# Patient Record
Sex: FEMALE | Race: BLACK OR AFRICAN AMERICAN
[De-identification: names, ages, dates, MRNs, and addresses within clinical notes are randomized per-mention and may not be internally consistent; named-entity substitution may affect disease eponyms.]

---

## 2018-01-18 ENCOUNTER — HOSPITAL ENCOUNTER (EMERGENCY)
Dept: HOSPITAL 17 - NEPD | Age: 53
Discharge: HOME | End: 2018-01-18
Payer: COMMERCIAL

## 2018-01-18 VITALS
HEART RATE: 106 BPM | TEMPERATURE: 97.9 F | SYSTOLIC BLOOD PRESSURE: 111 MMHG | RESPIRATION RATE: 20 BRPM | DIASTOLIC BLOOD PRESSURE: 71 MMHG | OXYGEN SATURATION: 100 %

## 2018-01-18 VITALS — RESPIRATION RATE: 16 BRPM

## 2018-01-18 VITALS — BODY MASS INDEX: 36.92 KG/M2 | HEIGHT: 62 IN | WEIGHT: 200.62 LBS

## 2018-01-18 DIAGNOSIS — R10.11: Primary | ICD-10-CM

## 2018-01-18 DIAGNOSIS — K76.0: ICD-10-CM

## 2018-01-18 DIAGNOSIS — E78.00: ICD-10-CM

## 2018-01-18 DIAGNOSIS — F17.200: ICD-10-CM

## 2018-01-18 DIAGNOSIS — F32.9: ICD-10-CM

## 2018-01-18 LAB
ALBUMIN SERPL-MCNC: 3.7 GM/DL (ref 3.4–5)
ALP SERPL-CCNC: 113 U/L (ref 45–117)
ALT SERPL-CCNC: 13 U/L (ref 10–53)
AST SERPL-CCNC: 18 U/L (ref 15–37)
BASOPHILS # BLD AUTO: 0.1 TH/MM3 (ref 0–0.2)
BASOPHILS NFR BLD: 0.4 % (ref 0–2)
BILIRUB SERPL-MCNC: 1 MG/DL (ref 0.2–1)
BUN SERPL-MCNC: 11 MG/DL (ref 7–18)
CALCIUM SERPL-MCNC: 9 MG/DL (ref 8.5–10.1)
CHLORIDE SERPL-SCNC: 106 MEQ/L (ref 98–107)
CREAT SERPL-MCNC: 0.91 MG/DL (ref 0.5–1)
EOSINOPHIL # BLD: 0 TH/MM3 (ref 0–0.4)
EOSINOPHIL NFR BLD: 0.1 % (ref 0–4)
ERYTHROCYTE [DISTWIDTH] IN BLOOD BY AUTOMATED COUNT: 13.6 % (ref 11.6–17.2)
GFR SERPLBLD BASED ON 1.73 SQ M-ARVRAT: 79 ML/MIN (ref 89–?)
GLUCOSE SERPL-MCNC: 114 MG/DL (ref 74–106)
HCO3 BLD-SCNC: 27.6 MEQ/L (ref 21–32)
HCT VFR BLD CALC: 40.7 % (ref 35–46)
HGB BLD-MCNC: 13.7 GM/DL (ref 11.6–15.3)
LIPASE: 160 U/L (ref 73–393)
LYMPHOCYTES # BLD AUTO: 3 TH/MM3 (ref 1–4.8)
LYMPHOCYTES NFR BLD AUTO: 22.3 % (ref 9–44)
MCH RBC QN AUTO: 27.4 PG (ref 27–34)
MCHC RBC AUTO-ENTMCNC: 33.6 % (ref 32–36)
MCV RBC AUTO: 81.5 FL (ref 80–100)
MONOCYTE #: 0.7 TH/MM3 (ref 0–0.9)
MONOCYTES NFR BLD: 4.8 % (ref 0–8)
NEUTROPHILS # BLD AUTO: 9.8 TH/MM3 (ref 1.8–7.7)
NEUTROPHILS NFR BLD AUTO: 72.4 % (ref 16–70)
PLATELET # BLD: 453 TH/MM3 (ref 150–450)
PMV BLD AUTO: 8.5 FL (ref 7–11)
PROT SERPL-MCNC: 8.5 GM/DL (ref 6.4–8.2)
RBC # BLD AUTO: 4.99 MIL/MM3 (ref 4–5.3)
SODIUM SERPL-SCNC: 139 MEQ/L (ref 136–145)
WBC # BLD AUTO: 13.6 TH/MM3 (ref 4–11)

## 2018-01-18 PROCEDURE — 96374 THER/PROPH/DIAG INJ IV PUSH: CPT

## 2018-01-18 PROCEDURE — 96375 TX/PRO/DX INJ NEW DRUG ADDON: CPT

## 2018-01-18 PROCEDURE — 85025 COMPLETE CBC W/AUTO DIFF WBC: CPT

## 2018-01-18 PROCEDURE — 76705 ECHO EXAM OF ABDOMEN: CPT

## 2018-01-18 PROCEDURE — 83690 ASSAY OF LIPASE: CPT

## 2018-01-18 PROCEDURE — 99284 EMERGENCY DEPT VISIT MOD MDM: CPT

## 2018-01-18 PROCEDURE — 80053 COMPREHEN METABOLIC PANEL: CPT

## 2018-01-18 NOTE — RADRPT
EXAM DATE/TIME:  2018 11:39 

 

[facility] COMPARISON:     

US ABDOMEN - GALLBLADDER, May 29, 2013, 10:30.

        

 

 

INDICATIONS :     

Abdominal pain. Gallstones. 

                     

 

MEDICAL HISTORY :     

Hypercholesterolemia. Gastroesophageal reflux disease. Osteoarthritis. Gastritis. Gallstones. Depress
ion.

 

SURGICAL HISTORY :     

Hysterectomy.  section.   

 

ENCOUNTER:     

Initial

 

ACUITY:     

1 day

 

PAIN SCORE:     

10/10

 

LOCATION:     

Right upper quadrant 

                     

MEASUREMENTS:     

 

LIVER:     

13.7 cm length 

 

COMMON DUCT:     

5 mm

 

RIGHT KIDNEY:     

11.3 x 6.0 x 5.1 cm

 

FINDINGS: 

Suboptimal study due to overlying bowel gas and the patient's body habitus limiting visualization.   
  

 

LIVER:     

Normal in size and shape with mildly increased echogenicity. There is no focal mass or ductal dilatat
ion.

 

COMMON DUCT:     

No intraluminal mass or stone visualized.  

 

GALLBLADDER:          

Normal in size and wall thickness. There is a small echogenic apparent gallstone as well as sludge. T
here is no pericholecystic fluid. There is a negative sonographic Bowers's.

 

PANCREAS:          

The visualized portions are within normal limits.  

 

RIGHT KIDNEY:          

No evidence of hydronephrosis, stone, or mass.  

 

CONCLUSION:     

1. Apparent small gallstone and sludge with no wall thickening or biliary obstruction.

2. Mild hepatic steatosis.

3. Suboptimal visualization.

 

 

 

 Niko Bell MD on 2018 at 12:23           

Board Certified Radiologist.

 This report was verified electronically.

## 2018-01-18 NOTE — PD
HPI


Chief Complaint:  Abdominal Pain


Time Seen by Provider:  10:00


Travel History


International Travel<30 days:  No


Contact w/Intl Traveler<30days:  No


Traveled to known affect area:  No





History of Present Illness


HPI


Patient is a 52-year-old female presents emergency department for evaluation of 

right upper quadrant abdominal pain nausea without vomiting and no diarrhea.  

Patient states been going on for the past several months worsening last night.  

She states that she recently got insurance and no she has gallstones and wants 

to have them taken out.  She has not seen a surgeon for this problem.  Denies 

any fevers.  She states the pain is moderate, right upper quadrant, no radiation

, context associated signs symptoms as above.





PFSH


Past Medical History


Depression:  Yes


Cardiovascular Problems:  Yes (LOW BLOOD PRESSURE)


High Cholesterol:  Yes


Diminished Hearing:  No


Gastrointestinal Disorders:  Yes (GASTRITIS, GALLSTONES)


GERD:  Yes


Immunizations Current:  Yes


Pregnant?:  Not Pregnant





Past Surgical History


 Section:  Yes (x3)


Gynecologic Surgery:  Yes (HYSTERECTOMY)


Hysterectomy:  Yes





Social History


Alcohol Use:  Yes (SOCIAL)


Tobacco Use:  Yes (1/2 ppd)


Substance Use:  No





Allergies-Medications


(Allergen,Severity, Reaction):  


Coded Allergies:  


     metronidazole (Unverified  Allergy, Severe, 18)


Reported Meds & Prescriptions





Reported Meds & Active Scripts


Active


Zofran Odt (Ondansetron Odt) 4 Mg Tab 4 Mg SL ONCE


Reported


Latuda (Lurasidone) 40 Mg Tab 40 Mg PO DAILY


Clonidine (Clonidine HCl) 0.1 Mg Tab 0.1 Mg PO BID


Atorvastatin (Atorvastatin Calcium) 80 Mg Tab 80 Mg PO HS








Review of Systems


Except as stated in HPI:  all other systems reviewed are Neg





Physical Exam


Narrative


GENERAL: Well-developed well-nourished in no obvious distress


SKIN: Focused skin assessment warm/dry.


HEAD: Atraumatic. Normocephalic. 


EYES: Pupils equal and round. No scleral icterus. No injection or drainage. 


ENT: No nasal bleeding or discharge.  Mucous membranes pink and moist.


NECK: Trachea midline. No JVD. 


CARDIOVASCULAR: Regular rate and rhythm.  No murmur appreciated.


RESPIRATORY: No accessory muscle use. Clear to auscultation. Breath sounds 

equal bilaterally. 


GASTROINTESTINAL: Abdomen soft, minimally tender in the right upper quadrant 

and Bowers sign is negative.  No rebound no percussive tenderness.  Psoas and 

obturator signs.  No peritoneal signs, no percussive tenderness. nondistended. 

Hepatic and splenic margins not palpable. 


MUSCULOSKELETAL: No obvious deformities. No clubbing.  No cyanosis.  No edema. 


NEUROLOGICAL: Awake and alert. No obvious cranial nerve deficits.  Motor 

grossly within normal limits. Normal speech.


PSYCHIATRIC: Appropriate mood and affect; insight and judgment normal.





Data


Data


Last Documented VS





Vital Signs








  Date Time  Temp Pulse Resp B/P (MAP) Pulse Ox O2 Delivery O2 Flow Rate FiO2


 


18 12:45        


 


18 11:50   16     


 


18 09:36 97.9 106   100 Room Air  








Orders





 Orders


Complete Blood Count With Diff (18 10:17)


Comprehensive Metabolic Panel (18 10:17)


Lipase (18 10:17)


Iv Access Insert/Monitor (18 10:17)


Ecg Monitoring (18 10:17)


Oximetry (18 10:17)


Ondansetron Inj (Zofran Inj) (18 10:30)


Sodium Chloride 0.9% Flush (Ns Flush) (18 10:30)


Ketorolac Inj (Toradol Inj) (18 10:30)


Us Abdomen Gallbladder (18 )


Ed Discharge Order (18 12:32)





Labs





Laboratory Tests








Test


  18


10:30


 


White Blood Count 13.6 TH/MM3 


 


Red Blood Count 4.99 MIL/MM3 


 


Hemoglobin 13.7 GM/DL 


 


Hematocrit 40.7 % 


 


Mean Corpuscular Volume 81.5 FL 


 


Mean Corpuscular Hemoglobin 27.4 PG 


 


Mean Corpuscular Hemoglobin


Concent 33.6 % 


 


 


Red Cell Distribution Width 13.6 % 


 


Platelet Count 453 TH/MM3 


 


Mean Platelet Volume 8.5 FL 


 


Neutrophils (%) (Auto) 72.4 % 


 


Lymphocytes (%) (Auto) 22.3 % 


 


Monocytes (%) (Auto) 4.8 % 


 


Eosinophils (%) (Auto) 0.1 % 


 


Basophils (%) (Auto) 0.4 % 


 


Neutrophils # (Auto) 9.8 TH/MM3 


 


Lymphocytes # (Auto) 3.0 TH/MM3 


 


Monocytes # (Auto) 0.7 TH/MM3 


 


Eosinophils # (Auto) 0.0 TH/MM3 


 


Basophils # (Auto) 0.1 TH/MM3 


 


CBC Comment DIFF FINAL 


 


Differential Comment  


 


Blood Urea Nitrogen 11 MG/DL 


 


Creatinine 0.91 MG/DL 


 


Random Glucose 114 MG/DL 


 


Total Protein 8.5 GM/DL 


 


Albumin 3.7 GM/DL 


 


Calcium Level 9.0 MG/DL 


 


Alkaline Phosphatase 113 U/L 


 


Aspartate Amino Transf


(AST/SGOT) 18 U/L 


 


 


Alanine Aminotransferase


(ALT/SGPT) 13 U/L 


 


 


Total Bilirubin 1.0 MG/DL 


 


Sodium Level 139 MEQ/L 


 


Potassium Level 4.1 MEQ/L 


 


Chloride Level 106 MEQ/L 


 


Carbon Dioxide Level 27.6 MEQ/L 


 


Anion Gap 5 MEQ/L 


 


Estimat Glomerular Filtration


Rate 79 ML/MIN 


 


 


Lipase 160 U/L 











[facility]


Medical Decision Making


Medical Screen Exam Complete:  Yes


Emergency Medical Condition:  Yes


Differential Diagnosis


Cholecystitis unlikely, biliary colic, gastritis, pancreatitis.


Narrative Course


Patient roomed in emergency department, basic labs are reassuring, lipase 

negative.  Patient underwent ultrasound of the right upper abdomen shows:


Last 24 hours Impressions








Gall Bladder Ultrasound 18 0000 Signed





Impressions: 





 Service Date/Time:   11:39 - CONCLUSION:  1. 

Apparent 





 small gallstone and sludge with no wall thickening or biliary obstruction. 2. 





 Mild hepatic steatosis. 3. Suboptimal visualization.     Niko Bell MD 





Patient feeling better after pain medicines and antiemetics in the emergency 

department, on further review patient did have fried chicken wings last night, 

discussed avoiding fatty foods, return to ED criteria symptomatic management 

follow-up with a general surgeon.  She is stable for discharge.





Diagnosis





 Primary Impression:  


 Right upper quadrant abdominal pain


Referrals:  


Ruddy Gonzalez MD


***Med/Other Pt SpecificInfo:  Prescription(s) given


Scripts


Ondansetron Odt (Zofran Odt) 4 Mg Tab


4 MG SL ONCE for Nausea/Vomiting, #10 TAB 0 Refills


   Prov: Walker Sarmiento MD         18


Disposition:  01 DISCHARGE HOME


Condition:  Stable











Walker Sarmiento MD 2018 10:24

## 2018-03-13 ENCOUNTER — HOSPITAL ENCOUNTER (OUTPATIENT)
Dept: HOSPITAL 17 - NEPK | Age: 53
Discharge: HOME | End: 2018-03-13
Attending: SURGERY
Payer: COMMERCIAL

## 2018-03-13 VITALS
RESPIRATION RATE: 18 BRPM | TEMPERATURE: 98 F | DIASTOLIC BLOOD PRESSURE: 71 MMHG | HEART RATE: 79 BPM | OXYGEN SATURATION: 96 % | SYSTOLIC BLOOD PRESSURE: 115 MMHG

## 2018-03-13 VITALS — HEIGHT: 62 IN | BODY MASS INDEX: 36.76 KG/M2 | WEIGHT: 199.74 LBS

## 2018-03-13 VITALS
TEMPERATURE: 97.9 F | DIASTOLIC BLOOD PRESSURE: 68 MMHG | HEART RATE: 89 BPM | SYSTOLIC BLOOD PRESSURE: 106 MMHG | RESPIRATION RATE: 16 BRPM

## 2018-03-13 DIAGNOSIS — K80.10: Primary | ICD-10-CM

## 2018-03-13 DIAGNOSIS — E78.5: ICD-10-CM

## 2018-03-13 DIAGNOSIS — R94.31: ICD-10-CM

## 2018-03-13 LAB
ALBUMIN SERPL-MCNC: 3.3 GM/DL (ref 3.4–5)
ALP SERPL-CCNC: 101 U/L (ref 45–117)
ALT SERPL-CCNC: 13 U/L (ref 10–53)
AST SERPL-CCNC: 10 U/L (ref 15–37)
BACTERIA #/AREA URNS HPF: (no result) /HPF
BASOPHILS # BLD AUTO: 0.1 TH/MM3 (ref 0–0.2)
BASOPHILS NFR BLD: 0.8 % (ref 0–2)
BILIRUB SERPL-MCNC: 0.7 MG/DL (ref 0.2–1)
BUN SERPL-MCNC: 6 MG/DL (ref 7–18)
CALCIUM SERPL-MCNC: 8.6 MG/DL (ref 8.5–10.1)
CHLORIDE SERPL-SCNC: 111 MEQ/L (ref 98–107)
COLOR UR: YELLOW
CREAT SERPL-MCNC: 0.8 MG/DL (ref 0.5–1)
EOSINOPHIL # BLD: 0.2 TH/MM3 (ref 0–0.4)
EOSINOPHIL NFR BLD: 1.5 % (ref 0–4)
ERYTHROCYTE [DISTWIDTH] IN BLOOD BY AUTOMATED COUNT: 14.2 % (ref 11.6–17.2)
GFR SERPLBLD BASED ON 1.73 SQ M-ARVRAT: 91 ML/MIN (ref 89–?)
GLUCOSE SERPL-MCNC: 114 MG/DL (ref 74–106)
GLUCOSE UR STRIP-MCNC: (no result) MG/DL
HCO3 BLD-SCNC: 29.7 MEQ/L (ref 21–32)
HCT VFR BLD CALC: 40.1 % (ref 35–46)
HGB BLD-MCNC: 13.7 GM/DL (ref 11.6–15.3)
HGB UR QL STRIP: (no result)
HYALINE CASTS #/AREA URNS LPF: 1 /LPF
KETONES UR STRIP-MCNC: (no result) MG/DL
LYMPHOCYTES # BLD AUTO: 3.8 TH/MM3 (ref 1–4.8)
LYMPHOCYTES NFR BLD AUTO: 37.5 % (ref 9–44)
MCH RBC QN AUTO: 27.6 PG (ref 27–34)
MCHC RBC AUTO-ENTMCNC: 34.2 % (ref 32–36)
MCV RBC AUTO: 80.7 FL (ref 80–100)
MONOCYTE #: 0.8 TH/MM3 (ref 0–0.9)
MONOCYTES NFR BLD: 7.8 % (ref 0–8)
MUCOUS THREADS #/AREA URNS LPF: (no result) /LPF
NEUTROPHILS # BLD AUTO: 5.3 TH/MM3 (ref 1.8–7.7)
NEUTROPHILS NFR BLD AUTO: 52.4 % (ref 16–70)
NITRITE UR QL STRIP: (no result)
PLATELET # BLD: 464 TH/MM3 (ref 150–450)
PMV BLD AUTO: 8.2 FL (ref 7–11)
PROT SERPL-MCNC: 7.4 GM/DL (ref 6.4–8.2)
RBC # BLD AUTO: 4.97 MIL/MM3 (ref 4–5.3)
SODIUM SERPL-SCNC: 146 MEQ/L (ref 136–145)
SP GR UR STRIP: 1.02 (ref 1–1.03)
SQUAMOUS #/AREA URNS HPF: 15 /HPF (ref 0–5)
URINE LEUKOCYTE ESTERASE: (no result)
WBC # BLD AUTO: 10 TH/MM3 (ref 4–11)

## 2018-03-13 PROCEDURE — 83690 ASSAY OF LIPASE: CPT

## 2018-03-13 PROCEDURE — 96366 THER/PROPH/DIAG IV INF ADDON: CPT

## 2018-03-13 PROCEDURE — 96365 THER/PROPH/DIAG IV INF INIT: CPT

## 2018-03-13 PROCEDURE — 47562 LAPAROSCOPIC CHOLECYSTECTOMY: CPT

## 2018-03-13 PROCEDURE — 85025 COMPLETE CBC W/AUTO DIFF WBC: CPT

## 2018-03-13 PROCEDURE — 93005 ELECTROCARDIOGRAM TRACING: CPT

## 2018-03-13 PROCEDURE — 80053 COMPREHEN METABOLIC PANEL: CPT

## 2018-03-13 PROCEDURE — 88304 TISSUE EXAM BY PATHOLOGIST: CPT

## 2018-03-13 PROCEDURE — 00790 ANES IPER UPR ABD NOS: CPT

## 2018-03-13 PROCEDURE — 81001 URINALYSIS AUTO W/SCOPE: CPT

## 2018-03-13 PROCEDURE — 99284 EMERGENCY DEPT VISIT MOD MDM: CPT

## 2018-03-13 NOTE — PD
HPI


Chief Complaint:  Abdominal Pain


Time Seen by Provider:  08:35


Travel History


International Travel<30 days:  No


Contact w/Intl Traveler<30days:  No


Traveled to known affect area:  No





History of Present Illness


HPI


52-year-old woman presents emerged from chronic right upper quadrant abdominal 

pain.  She is a history of gallbladder problems off and on for some time.  

States it has been worsening recently, and excruciating over the past 2 days.  

She has been following with Dr. Pagan, with general surgery, and reports that 

he referred her to come to the emergency department for evaluation and 

treatment.  She had nausea and vomiting with this.  No fevers.  No other 

complaints.





History


Past Medical History


*** Narrative Medical


High cholesterol


Depression


Gallbladder problems





Social History


Alcohol Use:  Yes (SOCIAL)


Tobacco Use:  Yes (1/2 ppd)





Allergies-Medications


(Allergen,Severity, Reaction):  


Coded Allergies:  


     metronidazole (Unverified  Allergy, Severe, 1/18/18)


Reported Meds & Prescriptions





Reported Meds & Active Scripts


Active


Zofran Odt (Ondansetron Odt) 4 Mg Tab 4 Mg SL ONCE


Reported


Latuda (Lurasidone) 40 Mg Tab 40 Mg PO DAILY


Clonidine (Clonidine HCl) 0.1 Mg Tab 0.1 Mg PO BID


Atorvastatin (Atorvastatin Calcium) 80 Mg Tab 80 Mg PO HS








Review of Systems


Except as stated in HPI:  all other systems reviewed are Neg





Physical Exam


Narrative


GENERAL: Well-appearing 52-year-old woman, no acute distress.


SKIN: Focused skin assessment warm/dry.


HEAD: Atraumatic. Normocephalic. 


EYES: Pupils equal and round. No scleral icterus. No injection or drainage. 


ENT: No nasal bleeding or discharge.  Mucous membranes pink and moist.


NECK: Trachea midline. No JVD. 


CARDIOVASCULAR: Regular rate and rhythm.  No murmur appreciated.


RESPIRATORY: No accessory muscle use. Clear to auscultation. Breath sounds 

equal bilaterally. 


GASTROINTESTINAL: Abdomen obese and soft.  Significant right upper quadrant 

tenderness.  A little bit of voluntary guarding.  No rebound.


MUSCULOSKELETAL: No obvious deformities. No clubbing.  No cyanosis.  No edema. 


NEUROLOGICAL: Awake and alert. No obvious cranial nerve deficits.  Motor 

grossly within normal limits. Normal speech.





Data


Data


Last Documented VS





Vital Signs








  Date Time  Temp Pulse Resp B/P (MAP) Pulse Ox O2 Delivery O2 Flow Rate FiO2


 


3/13/18 07:20 97.9 89 16 106/68 (81)    








Orders





 Orders


Complete Blood Count With Diff (3/13/18 07:26)


Comprehensive Metabolic Panel (3/13/18 07:26)


Urinalysis - C+S If Indicated (3/13/18 07:26)


Iv Access Insert/Monitor (3/13/18 07:26)


Oxygen Administration (3/13/18 07:26)


Oximetry (3/13/18 07:26)


Lipase (3/13/18 07:26)


Admit Order (Ed Use Only) (3/13/18 )





Labs





Laboratory Tests








Test


  3/13/18


07:36 3/13/18


07:38


 


White Blood Count 10.0 TH/MM3  


 


Red Blood Count 4.97 MIL/MM3  


 


Hemoglobin 13.7 GM/DL  


 


Hematocrit 40.1 %  


 


Mean Corpuscular Volume 80.7 FL  


 


Mean Corpuscular Hemoglobin 27.6 PG  


 


Mean Corpuscular Hemoglobin


Concent 34.2 % 


  


 


 


Red Cell Distribution Width 14.2 %  


 


Platelet Count 464 TH/MM3  


 


Mean Platelet Volume 8.2 FL  


 


Neutrophils (%) (Auto) 52.4 %  


 


Lymphocytes (%) (Auto) 37.5 %  


 


Monocytes (%) (Auto) 7.8 %  


 


Eosinophils (%) (Auto) 1.5 %  


 


Basophils (%) (Auto) 0.8 %  


 


Neutrophils # (Auto) 5.3 TH/MM3  


 


Lymphocytes # (Auto) 3.8 TH/MM3  


 


Monocytes # (Auto) 0.8 TH/MM3  


 


Eosinophils # (Auto) 0.2 TH/MM3  


 


Basophils # (Auto) 0.1 TH/MM3  


 


CBC Comment DIFF FINAL  


 


Differential Comment   


 


Blood Urea Nitrogen 6 MG/DL  


 


Creatinine 0.80 MG/DL  


 


Random Glucose 114 MG/DL  


 


Total Protein 7.4 GM/DL  


 


Albumin 3.3 GM/DL  


 


Calcium Level 8.6 MG/DL  


 


Alkaline Phosphatase 101 U/L  


 


Aspartate Amino Transf


(AST/SGOT) 10 U/L 


  


 


 


Alanine Aminotransferase


(ALT/SGPT) 13 U/L 


  


 


 


Total Bilirubin 0.7 MG/DL  


 


Sodium Level 146 MEQ/L  


 


Potassium Level 3.9 MEQ/L  


 


Chloride Level 111 MEQ/L  


 


Carbon Dioxide Level 29.7 MEQ/L  


 


Anion Gap 5 MEQ/L  


 


Estimat Glomerular Filtration


Rate 91 ML/MIN 


  


 


 


Lipase 212 U/L  


 


Urine Color  YELLOW 


 


Urine Turbidity  HAZY 


 


Urine pH  5.5 


 


Urine Specific Gravity  1.021 


 


Urine Protein  TRACE mg/dL 


 


Urine Glucose (UA)  NEG mg/dL 


 


Urine Ketones  NEG mg/dL 


 


Urine Occult Blood  TRACE 


 


Urine Nitrite  NEG 


 


Urine Bilirubin  NEG 


 


Urine Urobilinogen


  


  LESS THAN 2.0


MG/DL


 


Urine Leukocyte Esterase  TRACE 


 


Urine RBC  2 /hpf 


 


Urine WBC  5 /hpf 


 


Urine Squamous Epithelial


Cells 


  15 /hpf 


 


 


Urine Bacteria  RARE /hpf 


 


Urine Hyaline Casts  1 /lpf 


 


Urine Mucus  MANY /lpf 


 


Microscopic Urinalysis Comment


  


  CULT NOT


INDICATED











MDM


Medical Decision Making


Medical Screen Exam Complete:  Yes


Emergency Medical Condition:  Yes


Interpretation(s)


LABS:


CBC is unremarkable.


CMP is remarkable for mildly elevated glucose, mildly elevated sodium and 

chloride.


Lipase 212


Urinalysis unremarkable.


Differential Diagnosis


Cholelithiasis, cholecystitis, gastritis, other


Narrative Course


Medical decision making





INITIAL: Is a 52-year-old woman who presents to the emergency department 

complaining of ongoing epigastric and right upper quadrant abdominal pain.  

Reportedly referred him for her surgeon for worsening known gallbladder disease 

for treatment.  Will discuss with Dr. Pagan, review labs, likely admission.





Spoke with Dr. Pagan, will take to same-day surgery for cholecystectomy.





Diagnosis





 Primary Impression:  


 Cholecystitis





Admitting Information


Admitting Physician Requests:  Jered Joyce MD Mar 13, 2018 09:41

## 2018-03-13 NOTE — PD.OP
cc:   Quentin Pagan MD


__________________________________________________





Operative Report


Date of Surgery:  Mar 13, 2018


Preoperative Diagnosis:  


Chronic cholecystitis and cholelithiasis


Postoperative Diagnosis:  


Chronic cholecystitis and cholelithiasis


Procedure:


Laparoscopic cholecystectomy


Anesthesia:


General endotracheal


Surgeon:


Quentin Pagan


Assistant(s):


Shefali Moncada, MS 3


Operation and Findings:


Operative findings: The patient was found to have a slightly thick-walled, 

chronically diseased-appearing gallbladder with minimal adhesions.  There was a 

large amount of fatty tissue around the cystic duct and cystic artery but 

otherwise no abnormalities.  The patient was seen at the end of the operation 

to have multiple small stones within the lumen of the gallbladder but the 

cystic duct and common bile duct were seen to be of normal caliber.





Operative procedure: The patient was brought to the operating room and after 

satisfactory general endotracheal anesthesia was obtained, the abdomen was 

prepped and draped in usual sterile fashion.  0.5% Marcaine with epinephrine 

was used to infiltrate the skin for local anesthesia.  A 5 mm trocar was placed 

into the peritoneal cavity under direct visualization after which the abdomen 

was distended to 15 mmHg using carbon dioxide.  The camera was reinserted and 

visceral injury inspected for, with none being identified.  Under direct 

visualization a 12 port and a 5 port were placed in the upper abdomen.  The 

patient is noted to have multiple omental adhesions to the upper midline 

without any obvious direct cause.





The fundus of the gallbladder was identified grasped and retracted superiorly 

over the right lobe of the liver.  Hopper's pouch was cleared of fatty tissue 

grasped and then retracted inferiorly and laterally placing tension on the 

hepatoduodenal ligament.  With minimal difficulty the cystic artery and cystic 

duct were dissected free to obtain the critical view.  Once the critical view 

of been obtained, the cystic artery was divided near structures with the 

gallbladder using the harmonic scalpel.  The cystic duct was then likewise 

divided near the gallbladder using the harmonic scalpel without difficulty.  

The gallbladder was dissected free from the liver bed using the Harmonic.  It 

was placed within an Endo Catch bag and brought through the upper midline 

incision where his withdrawn without problem.  The 12 mm trocar was reinserted 

and the area of dissection was inspected carefully.  The cystic duct and cystic 

artery stumps were both inspected and found to be intact with no leakage of 

bile or blood.  The liver bed was hemostatic.  The carbon dioxide was then 

vented as completely as possible the atmosphere after which the ports were 

removed and the 12 mm fascial defect closed with interrupted 0 Vicryl sutures.  

Skin closed with interrupted 4-0 PDS subcuticular stitches.  Steri-Strips were 

applied and the patient was then awakened and taken from the operating room, in 

satisfactory condition, having tolerated the procedure without problem.  

Estimated blood loss was less than 10 mL's.  Instrument, sponge, and needle 

counts were reported as being correct 2 at the end of the procedure.











Quentin Pagan MD Mar 13, 2018 16:08

## 2018-03-14 NOTE — EKG
Date Performed: 03/13/2018       Time Performed: 12:35:23

 

PTAGE:      52 years

 

EKG:      Sinus rhythm 

 

 POSSIBLE LEFT ATRIAL ENLARGEMENT LEFT ANTERIOR FASCICULAR BLOCK POSSIBLE ANTERIOR MYOCARDIAL INFARCT
ION , OF INDETERMINATE AGE MODERATE T-WAVE ABNORMALITY, CONSIDER LATERAL ISCHEMIA MODERATE T-WAVE ABN
ORMALITY, CONSIDER INFERIOR ISCHEMIA ABNORMAL ECG 

 

NO PREVIOUS TRACING            

 

DOCTOR:   Arthur Rocha  Interpretating Date/Time  03/14/2018 23:46:28